# Patient Record
Sex: FEMALE | Race: WHITE
[De-identification: names, ages, dates, MRNs, and addresses within clinical notes are randomized per-mention and may not be internally consistent; named-entity substitution may affect disease eponyms.]

---

## 2020-10-22 ENCOUNTER — HOSPITAL ENCOUNTER (OUTPATIENT)
Dept: HOSPITAL 92 - L&D/OP | Age: 30
End: 2020-10-22
Attending: FAMILY MEDICINE
Payer: COMMERCIAL

## 2020-10-22 VITALS — BODY MASS INDEX: 32.5 KG/M2

## 2020-10-22 DIAGNOSIS — O36.8130: Primary | ICD-10-CM

## 2020-10-22 DIAGNOSIS — Z88.2: ICD-10-CM

## 2020-10-22 DIAGNOSIS — Z3A.37: ICD-10-CM

## 2020-10-22 DIAGNOSIS — O09.293: ICD-10-CM

## 2020-10-22 NOTE — PRG
DATE OF SERVICE:  10/22/2020



TIME OF SERVICE:  1400 hours.



PRESENTING COMPLAINT:  Decreased fetal movement at 37 weeks and 6 days.



HISTORY OF PRESENT ILLNESS:  Ms. Graham is a  4, para 2, with EDC of .

She was seen by Dr. Vivar at [facility] this morning for decreased

fetal movement.  She reported some fetal movement, just stated it seems subjectively

somewhat decreased.  On exam in the office, she had a modified biophysical profile

with NST, which revealed 6/10 for movement and reactive NST.  She was sent over for

prolonged fetal monitoring.  She denies rupture of membranes.  She has an active

fetus. 



OB/GYN HISTORY:  Blood type B positive.  Antibody negative.  Pap negative.  Rubella

immune.  VDRL nonreactive.  Hepatitis B, GC, chlamydia negative.  Group B strep

negative.  EDC of .  Miscarriage x1.  Spontaneous vaginal delivery x2. 



PAST MEDICAL HISTORY:  Denies.



PAST SURGICAL HISTORY:  Denies.



ALLERGIES:  SULFA.



MEDICATIONS:  Prenatal vitamins.



SOCIAL HISTORY:  Denies tobacco, alcohol, or IV drug use.



FAMILY HISTORY:  Noncontributory.



REVIEW OF SYSTEMS:  Noncontributory.



PHYSICAL EXAMINATION:

GENERAL:  White female, resting comfortably. 

VITAL SIGNS:  Blood pressure 129/77, pulse 78, respirations 18, temperature 99.1. 

HEENT:  Within normal limits. 

LUNGS:  Clear to auscultation bilaterally. 

HEART:  Regular rate and rhythm. 

ABDOMEN:  Soft and nontender.  Fundal height 38.  FHTs 130s to 140s. 

VAGINAL:  Deferred. 



Exam in the office today revealed a re-vertex, 150, -2. 



Prolonged fetal monitoring is carried out for greater than an hour, which revealed a

reactive fetal heart rate tracing and a reactive nonstress test.  Baseline was 130s

to 140s, positive accelerations were noted, no decelerations were noted, and no

significant contractions were noted. 



IMPRESSION:  Abnormal modified BPP, now resolved, at 37 weeks gestation with

reassuring fetal heart rate tracing. 



PLAN:  Reassurance.  Discharged home.  Continued kick counts.  ER precautions.

Return if decreased fetal movement resumes and keep scheduled followup with Dr. Vivar. 







Job ID:  [id number]

## 2020-10-30 ENCOUNTER — HOSPITAL ENCOUNTER (OUTPATIENT)
Dept: HOSPITAL 92 - L&D/OP | Age: 30
Discharge: HOME | End: 2020-10-30
Attending: FAMILY MEDICINE
Payer: COMMERCIAL

## 2020-10-30 VITALS — BODY MASS INDEX: 32.8 KG/M2

## 2020-10-30 DIAGNOSIS — O47.1: Primary | ICD-10-CM

## 2020-10-30 DIAGNOSIS — Z3A.39: ICD-10-CM

## 2020-10-30 DIAGNOSIS — O09.293: ICD-10-CM

## 2020-10-30 DIAGNOSIS — Z88.2: ICD-10-CM

## 2020-10-30 NOTE — PDOC.BPN
- Brief Progress Note


Encounter Date: 10/30/20


Encounter Time: 22:30





Patient seen at bedside.


At bedside now





Triage H&P Faculty Attestation





LDR 5





Patient of Dr zimmer





[id number] at 39 weeks on days here for poss CTX. Has IOL scheduled.


CX was 1cm at first check and recheck will be in about 30-40 minutes.


No LOF


Vitals wnl


NST reactive

## 2020-10-30 NOTE — PDOC.LDHP
Labor and Delivery H&P


Chief complaint: contractions


HPI: 





29 y/o  @ 39.0 weeks presents to L&D for ctx. 


ctx started @ 5 PM, became consistent (6-8 minutes apart) and painful/unable to 

talk through. 


Denies LOF, vag bleeding, vag d/c. 


reports good fetal movements 





Denies HA, visual changes, abd pain, swelling. 





PCP: Dr. Vviar 








Current gestational age (weeks): 39


Due date: 20


Grav: 4


Para: 2


OB History Details: 





Preg #1:  39.3 weeks;  





Preg #2: spon AB @ 7 weeks; 2017 





Preg #3:  @ 38.3; 2018








Current pregnancy complications: none


Abnormal US findings: No


Past Medical History: 





no known medical problems 


Current medications: pre- vitamins


Previous surgical history: none


Allergies/Adverse Reactions: 


                                    Allergies











Allergy/AdvReac Type Severity Reaction Status Date / Time


 


Sulfa (Sulfonamide Allergy Intermediate Hives Verified 10/22/20 13:07





Antibiotics)     











Social history: none





- Physical Exam


Vital signs reviewed and normal: yes


General: NAD


Heart: RRR


Lungs: CTAB


Abdomen: gravid


Extremeties: no edema


FHT: category 1 (140 baseline, mod dwight  acels present  no decels), variability 

present


[place] contractions every: 5-6 min 





- Vaginal Exam


cm dilated: 1


Effacement: 50%


Station: -3





- OB Labs


Blood type: B


RH: positive


Antibody Screen: negative


HIV: negative


RPR: negative


HEPSAg: negative


GBS: negative


Rubella: immune





- Assessment





29 y/o  @ 39.0 weeks presents to L&D for ctx.





1. SIUP @ 39.0 weeks 


- FHT and TOCO monitoring. 





2. CTX's 


- SVE: 50/-3 @ 20:50 on 10/30/20 


- will monitor on OBs for 2 hours and recheck SVE. 





Dispo: 2 hour obs. Monitor SVE for change.





Care plan discussed with Dr. Gonzalez, attending physician, who is in agreement 

with above stated plan.

## 2020-10-30 NOTE — PDOC.BPN
- Brief Progress Note


Encounter Date: 10/30/20


Encounter Time: 22:50





re-eval 


SVE: 1/50%/-3 


unchanged from previous exam 





ctx Q5 min on toco 


Cat 1 FHT strip 





return precautions explained in detail 





dispo: d/c home 


Pt agreeable to plan 


dispo plan discussed with Dr. Gonzalez, attending physician, who is in agreement wi

th above stated plan.

## 2020-10-31 ENCOUNTER — HOSPITAL ENCOUNTER (INPATIENT)
Dept: HOSPITAL 92 - L&D/OP | Age: 30
LOS: 1 days | Discharge: HOME | End: 2020-11-01
Attending: FAMILY MEDICINE | Admitting: FAMILY MEDICINE
Payer: COMMERCIAL

## 2020-10-31 VITALS — BODY MASS INDEX: 32.8 KG/M2

## 2020-10-31 DIAGNOSIS — Z20.828: ICD-10-CM

## 2020-10-31 DIAGNOSIS — Z88.2: ICD-10-CM

## 2020-10-31 DIAGNOSIS — Z3A.39: ICD-10-CM

## 2020-10-31 LAB
HBSAG INDEX: 0.16 S/CO (ref 0–0.99)
HGB BLD-MCNC: 13.5 G/DL (ref 12–16)
MCH RBC QN AUTO: 30.6 PG (ref 27–31)
MCV RBC AUTO: 88.3 FL (ref 78–98)
PLATELET # BLD AUTO: 147 THOU/UL (ref 130–400)
RBC # BLD AUTO: 4.39 MILL/UL (ref 4.2–5.4)
SYPHILIS ANTIBODY INDEX: 0.03 S/CO
WBC # BLD AUTO: 9.5 THOU/UL (ref 4.8–10.8)

## 2020-10-31 PROCEDURE — 86850 RBC ANTIBODY SCREEN: CPT

## 2020-10-31 PROCEDURE — 36415 COLL VENOUS BLD VENIPUNCTURE: CPT

## 2020-10-31 PROCEDURE — U0003 INFECTIOUS AGENT DETECTION BY NUCLEIC ACID (DNA OR RNA); SEVERE ACUTE RESPIRATORY SYNDROME CORONAVIRUS 2 (SARS-COV-2) (CORONAVIRUS DISEASE [COVID-19]), AMPLIFIED PROBE TECHNIQUE, MAKING USE OF HIGH THROUGHPUT TECHNOLOGIES AS DESCRIBED BY CMS-2020-01-R: HCPCS

## 2020-10-31 PROCEDURE — 87340 HEPATITIS B SURFACE AG IA: CPT

## 2020-10-31 PROCEDURE — 0KQM0ZZ REPAIR PERINEUM MUSCLE, OPEN APPROACH: ICD-10-PCS | Performed by: OBSTETRICS & GYNECOLOGY

## 2020-10-31 PROCEDURE — 85027 COMPLETE CBC AUTOMATED: CPT

## 2020-10-31 PROCEDURE — 86780 TREPONEMA PALLIDUM: CPT

## 2020-10-31 PROCEDURE — 87635 SARS-COV-2 COVID-19 AMP PRB: CPT

## 2020-10-31 PROCEDURE — 86900 BLOOD TYPING SEROLOGIC ABO: CPT

## 2020-10-31 PROCEDURE — 86901 BLOOD TYPING SEROLOGIC RH(D): CPT

## 2020-10-31 PROCEDURE — S0020 INJECTION, BUPIVICAINE HYDRO: HCPCS

## 2020-10-31 RX ADMIN — DOCUSATE CALCIUM SCH MG: 240 CAPSULE, LIQUID FILLED ORAL at 21:04

## 2020-10-31 NOTE — PDOC.LDHP
Labor and Delivery H&P


Chief complaint: contractions


HPI: 





31 y/o  @ 39.1 weeks presents to L&D for ctx that became more frequent 

and painful after leaving  L&D last night. 





reports good fetal movements


denies lof, vag d/c, vag bleeding 


+ ctx Q3 min, strong and painful, more so than last nigh t


see 10/30 note. 


She was discharged home after no cervicla change in 2 hour observation 





PCP Dr. Vivar. 


Current gestational age (weeks): 39 (39.1)


Due date: 20


Grav: 4


Para: 2


OB History Details: 





Preg #1:  39.3 weeks;  





Preg #2: spon AB @ 7 weeks; 2017 





Preg #3:  @ 38.3; 2018





Current pregnancy complications: none


Abnormal US findings: No


Past Medical History: 





no known medical problems 


Current medications: pre- vitamins


Previous surgical history: none


Allergies/Adverse Reactions: 


                                    Allergies











Allergy/AdvReac Type Severity Reaction Status Date / Time


 


Sulfa (Sulfonamide Allergy Intermediate Hives Verified 10/22/20 13:07





Antibiotics)     











Social history: none





- Physical Exam


Vital signs reviewed and normal: yes


General: breathing through contractions


Heart: RRR


Lungs: CTAB


Abdomen: gravid


Extremeties: no edema


FHT: category 1


[place] contractions every: Q3min





- Vaginal Exam


cm dilated: 6


Effacement: 100%


Station: 0





- OB Labs


Blood type: B


RH: positive


Antibody Screen: negative


HIV: negative


RPR: negative


HEPSAg: negative


GBS: negative


Rubella: immune





- Assessment


L&D Assessment: term patient in labor





- Plan


Plan: admit to L&D


-: 








31 y/o  @ 39.1 weeks presents to L&D for ctx that became more frequent 

and painful after leaving L&D last night. 





1. SIUP @ 39.1 weeks 


- continuous FHT and TOCO monitoring. 


- desires epidural for pain control - anesthesia consulted 


- Dr. Vivar PCP, admitting to on-call laborist this weekend. 


- GBS negative status 





2. CTX's in active labor


- SVE: /-3 @ 20:50 on 10/30/20 


- SVE: /0 @  05:50 on 10/31/20


- Q2 hours SVE. 





Dispo: admit to L&D for active labor 





Care plan discussed with Dr. Gonzalez, attending physician, who is in agreement 

with above stated plan.

## 2020-10-31 NOTE — PDOC.BPN
- Brief Progress Note





cat 1 FHT strip 


baseline 140, mod dwight, one variable observed. none recurrent. 





toco: ctx Q2 min

## 2020-10-31 NOTE — PDOC.OPDEL
OB Operative/Delivery Note





- Additional Findings/Plan


Compilations/Other Findings: 


Delivering Physician: Dr. Ellis Nogueira


Attending: Dr. Eddie López





Procedure: Spontaneous Vaginal Delivery 





Anesthesia: epidural





QBL: 50 ml 





Pre-op Diagnosis: 


1. Term intrauterine pregnancy in labor 





Post-op Diagnosis: 


1. Term intrauterine pregnancy, delivered 


2. 2nd Degree Perineal Laceration - repaired





Indications: A 29 y/o female  presents in active labor with 

contractions.





Delivery Note: This is 29 yo F  @ 39.1 wks who delivered a viable M 

infant at 0933. Following an uneventful antepartum course, a vigorous (sex) was 

delivered over an intact perineum in the BASHIR position. Anterior Shoulder and 

then remainder of the body delivered. Nuchal cord x2 easily reduced. The head 

was held down and mouth and nares were bulb suctioned. Cord clamped after 

delayed cord clamping and cut and cord blood collected. Placenta delivered 

intact with a 3 vessel cord noted. Fundal massage was performed and the fundus 

was firm. The cervix and vagina were inspected and 2nd degree perineal 

laceration was identified. This was closed in the usual fashion with good 

approximation and hemostasis. Infant went to  nursery in good condition 

for routine care. Apgars were 8/9 at 1 & 5 minutes, respectively. Patient to

lerated delivery well and went to postpartum after routine recovery/care.

## 2020-10-31 NOTE — PDOC.BPN
- Brief Progress Note


Encounter Date: 10/31/20


Encounter Time: 06:20





At bedside, plan of care again reviewed with her.


Strip reviewed.


128/79

## 2020-11-01 VITALS — DIASTOLIC BLOOD PRESSURE: 77 MMHG | SYSTOLIC BLOOD PRESSURE: 121 MMHG | TEMPERATURE: 98.2 F

## 2020-11-01 RX ADMIN — DOCUSATE CALCIUM SCH MG: 240 CAPSULE, LIQUID FILLED ORAL at 08:37
